# Patient Record
(demographics unavailable — no encounter records)

---

## 2024-12-09 NOTE — HISTORY OF PRESENT ILLNESS
[FreeTextEntry1] : Mr. VILLAGRAN returns for a neurosurgical revisit encounter. As a review, he was seen in the office approximately five months prior at which time it was advised for him to undergo permanent SCS placement for chronic lumbar radiculopathy (Tim Ramirez). Surgery had been scheduled but ultimately cancelled due to underlying advanced pulmonary disease. Since that time, he has significantly reduced his cigarette smoking from 2-3 PPD to 6 cigarettes/day. He is now cleared to proceed with surgery and returns for additional discussion.  Of note, previous MR C spine was not completed.  As previously mentioned, he has long suffered from chronic low back pain with associated radicular features into the bilateral lower extremities. He has long remained under the care of pain management and has undergone various interventional procedures such as lumbar epidurals as well as median branch block injections which failed to provide relief. He has noted progressive numbness, tingling, burning over the previous year involving the lower extremities therefore he underwent a spinal column stimulator trial in May of 2024 which provided him with over 75% pain relief as well as a near complete resolution of his lower extremity numbness, tingling. He noted improvements with ambulation and overall activity and he was largely content with his response to care during that time.  IMAGING: MR L spine 11/2023- Regional- advanced multilevel lumbar disc degeneration with central and biforaminal narrowing. MR T-spine 2022-regional-thoracic spine without significant central neuroforaminal stenosis. With regards to his cervical segment, /sagittal views reveal evidence of C3-4 grade 1-2 spondylolisthesis with moderate central stenosis at that segment. C4-5, C5-6 also with disc displacement and mild central stenosis.  EXAM CN II-XII grossly intact Palpation: (+) cervical and lumbar paravertebral tenderness to palpation Strength: Full strength in all major muscle groups, no atrophy Sensation: Full sensation to light touch in all extremities Reflexes: 2+ patellar 2+ biceps 2+ ankle jerk No Moreno's No clonus No babinski  ROM limited in flex/ext to cervical and lumbar segments.  SLR negative Gait: steady, walking w/o assistance.

## 2024-12-09 NOTE — ASSESSMENT
[FreeTextEntry1] : This is a 66 yo M who presents for neurosurgical revisit with regards to chronic pain affecting the lumbar region with associated lower extremity radiculopathy.  As mentioned previously, he is a candidate for permanent SCS placement, [place].  He had been scheduled in July 2024 but ultimately had to cancel due to underlying pulmonary issues.  He has since been optimized and has significantly reduced his cigarette smoking and is now cleared to proceed with surgery.  Risk and benefit along with expected postoperative outcome outlined at length and patient agreeable to proceed.  We have reiterated the need for MR C-spine without contrast considering his grade 1/2 spondylolisthesis at C3-4 as we wish to provide the appropriate anesthetic precautions.  We will schedule surgery to take place over the next 2 to 4 weeks and he will contact us with any questions or concerns in the interim.  We will revisit with him 2 weeks postprocedure.   ATTESTATION: I personally reviewed with the PA/NP, this patient's history and physical exam findings, as documented above. I have discussed the relevant areas of concern, having direct implications to the presenting problems and illnesses, and I have personally examined all pertinent and positive and negative findings, which impact their neurosurgical treatment.    I, Dr. Candelario, attest that this documentation has been prepared by Rubi Moon PA-C under my presence and direction  DAVIDE Grigsby MD